# Patient Record
(demographics unavailable — no encounter records)

---

## 2025-05-12 NOTE — HISTORY OF PRESENT ILLNESS
[de-identified] : First-time visit for this 73-year-old female she is here with a complaint of right knee pain.  Patient is remote history of being seen by myself greater than 5 to 6 years ago at that point she had done well with HA injections.  She has not tried recent physical therapy she is here because of increasing discomfort in the right knee lateral aspect.  She recalls no specific accident injury initiating traumatic event current pain is ongoing for at least 6 to 8 weeks.

## 2025-05-12 NOTE — PHYSICAL EXAM
[de-identified] : Right knee has some mild lateral joint line tenderness soft tissue swelling is noted but no effusion mild valgus inclination noted.  Range of motion 0 to 140 degrees the knee is stable to extra stress valgus testing with full extension and 90 degrees flexion. [de-identified] : Knee radiographs were ordered today AP standing individual lateral sunrise views were obtained showing modest diminishment of the lateral joint space on standing AP projection with very early mild findings of secondary osteoarthritis such as osteophyte and cyst formation noted.  Approximately 80% cartilage is maintained.

## 2025-05-12 NOTE — DISCUSSION/SUMMARY
[de-identified] : Is nowPatient I talked about the underlying etiology of recurrent right lateral knee pain.  She was able to review the radiographs with me together today and she was able to see the difference in the lateral compartment the right versus left knee with the right having some mild and early secondary findings of osteophyte cyst formation noted.  At this point patient defers on any anti-inflammatories she also defers on cortisone injection.  Will order single-dose HA injection which has worked well for her in the past we will notify the patient once available for use.  Today's consultation lasted 30 minutes exclusive teaching time and any separately billed procedures.

## 2025-05-12 NOTE — REASON FOR VISIT
[Initial Visit] : an initial visit for [Knee Pain] : knee pain [FreeTextEntry2] : RIGHT KNEE PAIN. NO INJURY/TRAUMA. HAVE BEEN SUFFERING WITH THIS PAIN FOR YEARS BUT THE PAIN BEGAN AGAIN 2-3 WEEKS. THE PAIN LOCALIZED AND IS INTERMITTENT. FEELS STIFFNESS AND SWEELING IN THE KNEE.  SHE HAS RECEIVED GEL INJECTINS IN THE PAST WHICH HELPED HER A GREAT DEAL. HAS TRIED IBUPROFEN WITH MINIMAL RELIEF. HAS NOT TRIED PHYSICAL THERAPY.